# Patient Record
Sex: MALE | Race: WHITE | NOT HISPANIC OR LATINO | Employment: FULL TIME | URBAN - METROPOLITAN AREA
[De-identification: names, ages, dates, MRNs, and addresses within clinical notes are randomized per-mention and may not be internally consistent; named-entity substitution may affect disease eponyms.]

---

## 2022-07-07 ENCOUNTER — HOSPITAL ENCOUNTER (EMERGENCY)
Facility: HOSPITAL | Age: 52
Discharge: HOME | End: 2022-07-07
Attending: EMERGENCY MEDICINE | Admitting: EMERGENCY MEDICINE

## 2022-07-07 VITALS
SYSTOLIC BLOOD PRESSURE: 128 MMHG | OXYGEN SATURATION: 99 % | HEIGHT: 75 IN | HEART RATE: 65 BPM | BODY MASS INDEX: 23.99 KG/M2 | TEMPERATURE: 98 F | DIASTOLIC BLOOD PRESSURE: 79 MMHG | RESPIRATION RATE: 18 BRPM | WEIGHT: 192.9 LBS

## 2022-07-07 DIAGNOSIS — K04.7 DENTAL INFECTION: Primary | ICD-10-CM

## 2022-07-07 DIAGNOSIS — R22.0 RIGHT FACIAL SWELLING: ICD-10-CM

## 2022-07-07 PROCEDURE — 99282 EMERGENCY DEPT VISIT SF MDM: CPT

## 2022-07-07 PROCEDURE — 96372 THER/PROPH/DIAG INJ SC/IM: CPT

## 2022-07-07 PROCEDURE — 25010000002 CEFTRIAXONE PER 250 MG: Performed by: NURSE PRACTITIONER

## 2022-07-07 RX ORDER — IBUPROFEN 800 MG/1
800 TABLET ORAL EVERY 6 HOURS PRN
Qty: 30 TABLET | Refills: 0 | Status: SHIPPED | OUTPATIENT
Start: 2022-07-07

## 2022-07-07 RX ORDER — CLINDAMYCIN HYDROCHLORIDE 300 MG/1
300 CAPSULE ORAL 3 TIMES DAILY
Qty: 30 CAPSULE | Refills: 0 | Status: SHIPPED | OUTPATIENT
Start: 2022-07-07

## 2022-07-07 RX ADMIN — LIDOCAINE HYDROCHLORIDE 1 G: 10 INJECTION, SOLUTION EPIDURAL; INFILTRATION; INTRACAUDAL; PERINEURAL at 09:41

## 2022-07-07 NOTE — DISCHARGE INSTRUCTIONS
Return to ER if you worsen or develop a temperature of 100.4 or higher.  Take antibiotic as prescribed and pain medication as needed.    Follow-up with dentist.

## 2022-07-07 NOTE — ED PROVIDER NOTES
"Subjective   Pt to ed with c/o dental pain and swelling to the right side of face that started three days ago. pt states has chronic hx of \"bad teeth.\"  Rates pain a 3 out of 10 today.          Review of Systems   HENT: Positive for dental problem and facial swelling.    All other systems reviewed and are negative.      History reviewed. No pertinent past medical history.    No Known Allergies    History reviewed. No pertinent surgical history.    History reviewed. No pertinent family history.    Social History     Socioeconomic History   • Marital status:            Objective   Physical Exam  Vitals and nursing note reviewed.   Constitutional:       General: He is not in acute distress.     Appearance: Normal appearance. He is not ill-appearing, toxic-appearing or diaphoretic.   HENT:      Head: Normocephalic and atraumatic.      Jaw: Swelling present.        Mouth/Throat:      Lips: Pink. No lesions.      Mouth: Mucous membranes are moist.      Dentition: Abnormal dentition. Gingival swelling and dental caries present.      Pharynx: No oropharyngeal exudate or posterior oropharyngeal erythema.     Cardiovascular:      Rate and Rhythm: Normal rate.      Pulses: Normal pulses.   Pulmonary:      Effort: Pulmonary effort is normal.   Abdominal:      General: Abdomen is flat.      Palpations: Abdomen is soft.   Musculoskeletal:         General: Normal range of motion.      Cervical back: Normal range of motion and neck supple.   Skin:     General: Skin is warm and dry.      Capillary Refill: Capillary refill takes less than 2 seconds.   Neurological:      Mental Status: He is alert and oriented to person, place, and time.   Psychiatric:         Mood and Affect: Mood normal.         Behavior: Behavior normal.         Thought Content: Thought content normal.         Judgment: Judgment normal.         Procedures           ED Course                                           MDM  Number of Diagnoses or Management " Options  Dental infection  Right facial swelling  Diagnosis management comments: VS stable, NAD, afebrile.    Patient will follow-up with PCP in 2 to 3 days.  Educated him on worrisome symptoms to return for and he verbalized understanding.    Risk of Complications, Morbidity, and/or Mortality  Presenting problems: moderate  Diagnostic procedures: moderate  Management options: moderate    Patient Progress  Patient progress: stable      Final diagnoses:   Dental infection   Right facial swelling       ED Disposition  ED Disposition     ED Disposition   Discharge    Condition   Stable    Comment   --             [facility] EMERGENCY ROOM  [address]  [facility] [address] [phone]  Go to   If symptoms worsen         Medication List      New Prescriptions    clindamycin 300 MG capsule  Commonly known as: CLEOCIN  Take 1 capsule by mouth 3 (Three) Times a Day.     ibuprofen 800 MG tablet  Commonly known as: ADVIL,MOTRIN  Take 1 tablet by mouth Every 6 (Six) Hours As Needed for Mild Pain .           Where to Get Your Medications      These medications were sent to [id number] - [place], [place] - [address] AT [facility] - [phone]  - [phone] FX  [address] [address]    Phone: [phone]   · clindamycin 300 MG capsule  · ibuprofen 800 MG tablet          Ewa Thompson, SHEILA  07/07/22 7697

## 2022-07-11 ENCOUNTER — HOSPITAL ENCOUNTER (EMERGENCY)
Facility: HOSPITAL | Age: 52
Discharge: HOME | End: 2022-07-11
Attending: EMERGENCY MEDICINE | Admitting: EMERGENCY MEDICINE

## 2022-07-11 VITALS
DIASTOLIC BLOOD PRESSURE: 89 MMHG | RESPIRATION RATE: 16 BRPM | HEART RATE: 72 BPM | OXYGEN SATURATION: 97 % | WEIGHT: 195.99 LBS | BODY MASS INDEX: 24.37 KG/M2 | TEMPERATURE: 98 F | SYSTOLIC BLOOD PRESSURE: 126 MMHG | HEIGHT: 75 IN

## 2022-07-11 DIAGNOSIS — Z20.822 ENCOUNTER FOR SCREENING LABORATORY TESTING FOR COVID-19 VIRUS: Primary | ICD-10-CM

## 2022-07-11 LAB — SARS-COV-2 RNA PNL SPEC NAA+PROBE: NOT DETECTED

## 2022-07-11 PROCEDURE — C9803 HOPD COVID-19 SPEC COLLECT: HCPCS | Performed by: EMERGENCY MEDICINE

## 2022-07-11 PROCEDURE — U0004 COV-19 TEST NON-CDC HGH THRU: HCPCS | Performed by: EMERGENCY MEDICINE

## 2022-07-11 PROCEDURE — 99283 EMERGENCY DEPT VISIT LOW MDM: CPT

## 2022-07-11 NOTE — DISCHARGE INSTRUCTIONS
Please quarantine pending your COVID results which you [facility]

## 2022-07-11 NOTE — ED PROVIDER NOTES
Subjective   Patient is a [age]-year-old male presenting today due to needing a COVID test.  Patient states that his roommate tested positive for COVID today and he needs a test done for his work.  Patient denies cough, fever, chills, nausea or vomiting.      History provided by:  Patient   used: No        Review of Systems   Constitutional: Negative.    HENT: Negative.    Eyes: Negative.    Respiratory: Negative.    Cardiovascular: Negative.    Gastrointestinal: Negative.    Endocrine: Negative.    Genitourinary: Negative.    Musculoskeletal: Negative.    Skin: Negative.    Allergic/Immunologic: Negative.    Neurological: Negative.    Hematological: Negative.    Psychiatric/Behavioral: Negative.        Past Medical History:   Diagnosis Date   • Depression        No Known Allergies    History reviewed. No pertinent surgical history.    History reviewed. No pertinent family history.    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   Constitutional:       Appearance: Normal appearance.   HENT:      Head: Normocephalic and atraumatic.      Nose: Nose normal.      Mouth/Throat:      Mouth: Mucous membranes are moist.   Eyes:      Extraocular Movements: Extraocular movements intact.      Conjunctiva/sclera: Conjunctivae normal.      Pupils: Pupils are equal, round, and reactive to light.   Cardiovascular:      Rate and Rhythm: Normal rate and regular rhythm.      Heart sounds: Normal heart sounds.   Pulmonary:      Effort: Pulmonary effort is normal.       Breath sounds: Normal breath sounds.   Musculoskeletal:         General: Normal range of motion.      Cervical back: Normal range of motion and neck supple.   Skin:     General: Skin is warm and dry.   Neurological:      General: No focal deficit present.      Mental Status: He is alert and oriented to person, place, and time.   Psychiatric:         Mood and Affect: Mood normal.         Behavior: Behavior normal.         Procedures           ED Course                                           MDM  Number of Diagnoses or Management Options  Diagnosis management comments: I have spoken with patient. I have explained the patient´s condition, diagnoses and treatment plan based on the information available to me at this time. I have answered the patient's questions and addressed any concerns. The patient has a good  understanding of the patient´s diagnosis, condition, and treatment plan as can be expected at this point. The vital signs have been stable. The patient´s condition is stable and appropriate for discharge from the emergency department.      The patient will pursue further outpatient evaluation with the primary care physician or other designated or consulting physician as outlined in the discharge instructions. They are agreeable to this plan of care and follow-up instructions have been explained in detail. The patient has received these instructions in written format and have expressed an understanding of the discharge instructions. The patient is aware that any significant change in condition or worsening of symptoms should prompt an immediate return to this or the closest emergency department or call to 911.      Risk of Complications, Morbidity, and/or Mortality  Presenting problems: low  Diagnostic procedures: low  Management options: low    Patient Progress  Patient progress: stable      Final diagnoses:   Encounter for screening laboratory testing for COVID-19 virus       ED Disposition  ED Disposition      ED Disposition   Discharge    Condition   Stable    Comment   --             No follow-up provider specified.       Medication List      No changes were made to your prescriptions during this visit.          Madhuri Fontanez PA-C  07/11/22 3410

## 2023-05-08 ENCOUNTER — ANESTHESIA EVENT (OUTPATIENT)
Dept: PERIOP | Facility: HOSPITAL | Age: 53
End: 2023-05-08
Payer: COMMERCIAL

## 2023-05-08 ENCOUNTER — HOSPITAL ENCOUNTER (EMERGENCY)
Facility: HOSPITAL | Age: 53
Discharge: HOME | End: 2023-05-08
Attending: EMERGENCY MEDICINE | Admitting: SURGERY
Payer: COMMERCIAL

## 2023-05-08 ENCOUNTER — APPOINTMENT (OUTPATIENT)
Dept: CT IMAGING | Facility: HOSPITAL | Age: 53
End: 2023-05-08
Payer: COMMERCIAL

## 2023-05-08 ENCOUNTER — ANESTHESIA (OUTPATIENT)
Dept: PERIOP | Facility: HOSPITAL | Age: 53
End: 2023-05-08
Payer: COMMERCIAL

## 2023-05-08 VITALS
RESPIRATION RATE: 16 BRPM | TEMPERATURE: 97.4 F | HEART RATE: 64 BPM | SYSTOLIC BLOOD PRESSURE: 125 MMHG | BODY MASS INDEX: 25.71 KG/M2 | OXYGEN SATURATION: 98 % | DIASTOLIC BLOOD PRESSURE: 77 MMHG | WEIGHT: 206.79 LBS | HEIGHT: 75 IN

## 2023-05-08 DIAGNOSIS — K35.80 ACUTE APPENDICITIS, UNSPECIFIED ACUTE APPENDICITIS TYPE: Primary | ICD-10-CM

## 2023-05-08 PROBLEM — K35.30 ACUTE APPENDICITIS WITH LOCALIZED PERITONITIS: Status: ACTIVE | Noted: 2023-05-08

## 2023-05-08 LAB
ALBUMIN SERPL-MCNC: 4.2 G/DL (ref 3.5–5.2)
ALBUMIN/GLOB SERPL: 1.2 G/DL
ALP SERPL-CCNC: 96 U/L (ref 39–117)
ALT SERPL W P-5'-P-CCNC: 28 U/L (ref 1–41)
ANION GAP SERPL CALCULATED.3IONS-SCNC: 10.1 MMOL/L (ref 5–15)
AST SERPL-CCNC: 22 U/L (ref 1–40)
BACTERIA UR QL AUTO: ABNORMAL /HPF
BASOPHILS # BLD AUTO: 0.09 10*3/MM3 (ref 0–0.2)
BASOPHILS NFR BLD AUTO: 0.6 % (ref 0–1.5)
BILIRUB SERPL-MCNC: 0.3 MG/DL (ref 0–1.2)
BILIRUB UR QL STRIP: NEGATIVE
BUN SERPL-MCNC: 14 MG/DL (ref 6–20)
BUN/CREAT SERPL: 13.5 (ref 7–25)
CALCIUM SPEC-SCNC: 9.3 MG/DL (ref 8.6–10.5)
CHLORIDE SERPL-SCNC: 100 MMOL/L (ref 98–107)
CLARITY UR: CLEAR
CO2 SERPL-SCNC: 25.9 MMOL/L (ref 22–29)
COLOR UR: YELLOW
CREAT SERPL-MCNC: 1.04 MG/DL (ref 0.76–1.27)
D-LACTATE SERPL-SCNC: 1.3 MMOL/L (ref 0.5–2)
D-LACTATE SERPL-SCNC: 2.3 MMOL/L (ref 0.5–2)
DEPRECATED RDW RBC AUTO: 46.3 FL (ref 37–54)
EGFRCR SERPLBLD CKD-EPI 2021: 86.4 ML/MIN/1.73
EOSINOPHIL # BLD AUTO: 0.26 10*3/MM3 (ref 0–0.4)
EOSINOPHIL NFR BLD AUTO: 1.6 % (ref 0.3–6.2)
ERYTHROCYTE [DISTWIDTH] IN BLOOD BY AUTOMATED COUNT: 14.3 % (ref 12.3–15.4)
GLOBULIN UR ELPH-MCNC: 3.4 GM/DL
GLUCOSE SERPL-MCNC: 125 MG/DL (ref 65–99)
GLUCOSE UR STRIP-MCNC: NEGATIVE MG/DL
HCT VFR BLD AUTO: 47.1 % (ref 37.5–51)
HGB BLD-MCNC: 15.7 G/DL (ref 13–17.7)
HGB UR QL STRIP.AUTO: ABNORMAL
HOLD SPECIMEN: NORMAL
HOLD SPECIMEN: NORMAL
HYALINE CASTS UR QL AUTO: ABNORMAL /LPF
IMM GRANULOCYTES # BLD AUTO: 0.08 10*3/MM3 (ref 0–0.05)
IMM GRANULOCYTES NFR BLD AUTO: 0.5 % (ref 0–0.5)
KETONES UR QL STRIP: NEGATIVE
LEUKOCYTE ESTERASE UR QL STRIP.AUTO: NEGATIVE
LIPASE SERPL-CCNC: 43 U/L (ref 13–60)
LYMPHOCYTES # BLD AUTO: 2.36 10*3/MM3 (ref 0.7–3.1)
LYMPHOCYTES NFR BLD AUTO: 14.5 % (ref 19.6–45.3)
MCH RBC QN AUTO: 29.5 PG (ref 26.6–33)
MCHC RBC AUTO-ENTMCNC: 33.3 G/DL (ref 31.5–35.7)
MCV RBC AUTO: 88.4 FL (ref 79–97)
MONOCYTES # BLD AUTO: 1.01 10*3/MM3 (ref 0.1–0.9)
MONOCYTES NFR BLD AUTO: 6.2 % (ref 5–12)
NEUTROPHILS NFR BLD AUTO: 12.43 10*3/MM3 (ref 1.7–7)
NEUTROPHILS NFR BLD AUTO: 76.6 % (ref 42.7–76)
NITRITE UR QL STRIP: NEGATIVE
NRBC BLD AUTO-RTO: 0 /100 WBC (ref 0–0.2)
PH UR STRIP.AUTO: <=5 [PH] (ref 5–8)
PLATELET # BLD AUTO: 262 10*3/MM3 (ref 140–450)
PMV BLD AUTO: 9.7 FL (ref 6–12)
POTASSIUM SERPL-SCNC: 4.4 MMOL/L (ref 3.5–5.2)
PROT SERPL-MCNC: 7.6 G/DL (ref 6–8.5)
PROT UR QL STRIP: NEGATIVE
RBC # BLD AUTO: 5.33 10*6/MM3 (ref 4.14–5.8)
RBC # UR STRIP: ABNORMAL /HPF
REF LAB TEST METHOD: ABNORMAL
SODIUM SERPL-SCNC: 136 MMOL/L (ref 136–145)
SP GR UR STRIP: 1.03 (ref 1–1.03)
SQUAMOUS #/AREA URNS HPF: ABNORMAL /HPF
UROBILINOGEN UR QL STRIP: ABNORMAL
WBC # UR STRIP: ABNORMAL /HPF
WBC NRBC COR # BLD: 16.23 10*3/MM3 (ref 3.4–10.8)
WHOLE BLOOD HOLD COAG: NORMAL
WHOLE BLOOD HOLD SPECIMEN: NORMAL

## 2023-05-08 PROCEDURE — 25510000001 IOPAMIDOL PER 1 ML: Performed by: EMERGENCY MEDICINE

## 2023-05-08 PROCEDURE — 81001 URINALYSIS AUTO W/SCOPE: CPT | Performed by: EMERGENCY MEDICINE

## 2023-05-08 PROCEDURE — 25010000002 PROPOFOL 10 MG/ML EMULSION: Performed by: NURSE ANESTHETIST, CERTIFIED REGISTERED

## 2023-05-08 PROCEDURE — 85025 COMPLETE CBC W/AUTO DIFF WBC: CPT | Performed by: EMERGENCY MEDICINE

## 2023-05-08 PROCEDURE — 80053 COMPREHEN METABOLIC PANEL: CPT | Performed by: EMERGENCY MEDICINE

## 2023-05-08 PROCEDURE — 44970 LAPAROSCOPY APPENDECTOMY: CPT | Performed by: SURGERY

## 2023-05-08 PROCEDURE — 99284 EMERGENCY DEPT VISIT MOD MDM: CPT

## 2023-05-08 PROCEDURE — 96365 THER/PROPH/DIAG IV INF INIT: CPT

## 2023-05-08 PROCEDURE — 96375 TX/PRO/DX INJ NEW DRUG ADDON: CPT

## 2023-05-08 PROCEDURE — 36415 COLL VENOUS BLD VENIPUNCTURE: CPT

## 2023-05-08 PROCEDURE — 74177 CT ABD & PELVIS W/CONTRAST: CPT

## 2023-05-08 PROCEDURE — 25010000002 ONDANSETRON PER 1 MG: Performed by: EMERGENCY MEDICINE

## 2023-05-08 PROCEDURE — 25010000002 PIPERACILLIN SOD-TAZOBACTAM PER 1 G: Performed by: EMERGENCY MEDICINE

## 2023-05-08 PROCEDURE — 25010000002 DEXAMETHASONE SODIUM PHOSPHATE 100 MG/10ML SOLUTION: Performed by: SURGERY

## 2023-05-08 PROCEDURE — 88304 TISSUE EXAM BY PATHOLOGIST: CPT | Performed by: SURGERY

## 2023-05-08 PROCEDURE — 99223 1ST HOSP IP/OBS HIGH 75: CPT | Performed by: SURGERY

## 2023-05-08 PROCEDURE — 99285 EMERGENCY DEPT VISIT HI MDM: CPT

## 2023-05-08 PROCEDURE — 25010000002 BUPRENORPHINE PER 0.1 MG: Performed by: SURGERY

## 2023-05-08 PROCEDURE — 83605 ASSAY OF LACTIC ACID: CPT | Performed by: EMERGENCY MEDICINE

## 2023-05-08 PROCEDURE — 25010000002 SUGAMMADEX 200 MG/2ML SOLUTION: Performed by: NURSE ANESTHETIST, CERTIFIED REGISTERED

## 2023-05-08 PROCEDURE — 83690 ASSAY OF LIPASE: CPT | Performed by: EMERGENCY MEDICINE

## 2023-05-08 PROCEDURE — 25010000002 MIDAZOLAM PER 1MG: Performed by: ANESTHESIOLOGY

## 2023-05-08 PROCEDURE — 44970 LAPAROSCOPY APPENDECTOMY: CPT

## 2023-05-08 PROCEDURE — 25010000002 HYDROMORPHONE 1 MG/ML SOLUTION: Performed by: EMERGENCY MEDICINE

## 2023-05-08 DEVICE — ENDOPATH ECHELON ENDOSCOPIC LINEAR CUTTER RELOADS, BLUE, 60MM
Type: IMPLANTABLE DEVICE | Site: ABDOMEN | Status: FUNCTIONAL
Brand: ECHELON ENDOPATH

## 2023-05-08 RX ORDER — HYDROCODONE BITARTRATE AND ACETAMINOPHEN 5; 325 MG/1; MG/1
1 TABLET ORAL EVERY 6 HOURS PRN
Qty: 8 TABLET | Refills: 0 | Status: SHIPPED | OUTPATIENT
Start: 2023-05-08

## 2023-05-08 RX ORDER — PROMETHAZINE HYDROCHLORIDE 12.5 MG/1
25 TABLET ORAL ONCE AS NEEDED
Status: DISCONTINUED | OUTPATIENT
Start: 2023-05-08 | End: 2023-05-08 | Stop reason: HOSPADM

## 2023-05-08 RX ORDER — MAGNESIUM HYDROXIDE 1200 MG/15ML
LIQUID ORAL AS NEEDED
Status: DISCONTINUED | OUTPATIENT
Start: 2023-05-08 | End: 2023-05-08 | Stop reason: HOSPADM

## 2023-05-08 RX ORDER — PROCHLORPERAZINE EDISYLATE 5 MG/ML
5 INJECTION INTRAMUSCULAR; INTRAVENOUS EVERY 6 HOURS PRN
Status: DISCONTINUED | OUTPATIENT
Start: 2023-05-08 | End: 2023-05-08

## 2023-05-08 RX ORDER — PROMETHAZINE HYDROCHLORIDE 25 MG/1
25 SUPPOSITORY RECTAL ONCE AS NEEDED
Status: DISCONTINUED | OUTPATIENT
Start: 2023-05-08 | End: 2023-05-08 | Stop reason: HOSPADM

## 2023-05-08 RX ORDER — SODIUM CHLORIDE, SODIUM LACTATE, POTASSIUM CHLORIDE, CALCIUM CHLORIDE 600; 310; 30; 20 MG/100ML; MG/100ML; MG/100ML; MG/100ML
9 INJECTION, SOLUTION INTRAVENOUS CONTINUOUS PRN
Status: DISCONTINUED | OUTPATIENT
Start: 2023-05-08 | End: 2023-05-08 | Stop reason: HOSPADM

## 2023-05-08 RX ORDER — SUCCINYLCHOLINE/SOD CL,ISO/PF 100 MG/5ML
SYRINGE (ML) INTRAVENOUS AS NEEDED
Status: DISCONTINUED | OUTPATIENT
Start: 2023-05-08 | End: 2023-05-08 | Stop reason: SURG

## 2023-05-08 RX ORDER — ROCURONIUM BROMIDE 10 MG/ML
INJECTION, SOLUTION INTRAVENOUS AS NEEDED
Status: DISCONTINUED | OUTPATIENT
Start: 2023-05-08 | End: 2023-05-08 | Stop reason: SURG

## 2023-05-08 RX ORDER — ONDANSETRON 2 MG/ML
4 INJECTION INTRAMUSCULAR; INTRAVENOUS EVERY 6 HOURS PRN
Status: DISCONTINUED | OUTPATIENT
Start: 2023-05-08 | End: 2023-05-08 | Stop reason: HOSPADM

## 2023-05-08 RX ORDER — PROPOFOL 10 MG/ML
VIAL (ML) INTRAVENOUS AS NEEDED
Status: DISCONTINUED | OUTPATIENT
Start: 2023-05-08 | End: 2023-05-08 | Stop reason: SURG

## 2023-05-08 RX ORDER — ONDANSETRON 2 MG/ML
4 INJECTION INTRAMUSCULAR; INTRAVENOUS EVERY 6 HOURS PRN
Status: DISCONTINUED | OUTPATIENT
Start: 2023-05-08 | End: 2023-05-08

## 2023-05-08 RX ORDER — KETAMINE HCL IN NACL, ISO-OSM 100MG/10ML
SYRINGE (ML) INJECTION AS NEEDED
Status: DISCONTINUED | OUTPATIENT
Start: 2023-05-08 | End: 2023-05-08 | Stop reason: SURG

## 2023-05-08 RX ORDER — SODIUM CHLORIDE, SODIUM LACTATE, POTASSIUM CHLORIDE, CALCIUM CHLORIDE 600; 310; 30; 20 MG/100ML; MG/100ML; MG/100ML; MG/100ML
100 INJECTION, SOLUTION INTRAVENOUS CONTINUOUS
Status: CANCELLED | OUTPATIENT
Start: 2023-05-08

## 2023-05-08 RX ORDER — OXYCODONE HYDROCHLORIDE 5 MG/1
5 TABLET ORAL
Status: DISCONTINUED | OUTPATIENT
Start: 2023-05-08 | End: 2023-05-08 | Stop reason: HOSPADM

## 2023-05-08 RX ORDER — DEXMEDETOMIDINE HYDROCHLORIDE 100 UG/ML
INJECTION, SOLUTION INTRAVENOUS AS NEEDED
Status: DISCONTINUED | OUTPATIENT
Start: 2023-05-08 | End: 2023-05-08 | Stop reason: SURG

## 2023-05-08 RX ORDER — ONDANSETRON 2 MG/ML
4 INJECTION INTRAMUSCULAR; INTRAVENOUS ONCE
Status: COMPLETED | OUTPATIENT
Start: 2023-05-08 | End: 2023-05-08

## 2023-05-08 RX ORDER — ACETAMINOPHEN 500 MG
1000 TABLET ORAL ONCE
Status: COMPLETED | OUTPATIENT
Start: 2023-05-08 | End: 2023-05-08

## 2023-05-08 RX ORDER — PROCHLORPERAZINE EDISYLATE 5 MG/ML
5 INJECTION INTRAMUSCULAR; INTRAVENOUS EVERY 6 HOURS PRN
Status: DISCONTINUED | OUTPATIENT
Start: 2023-05-08 | End: 2023-05-08 | Stop reason: HOSPADM

## 2023-05-08 RX ORDER — MIDAZOLAM HYDROCHLORIDE 2 MG/2ML
2 INJECTION, SOLUTION INTRAMUSCULAR; INTRAVENOUS ONCE
Status: COMPLETED | OUTPATIENT
Start: 2023-05-08 | End: 2023-05-08

## 2023-05-08 RX ORDER — SODIUM CHLORIDE 0.9 % (FLUSH) 0.9 %
10 SYRINGE (ML) INJECTION AS NEEDED
Status: DISCONTINUED | OUTPATIENT
Start: 2023-05-08 | End: 2023-05-08 | Stop reason: HOSPADM

## 2023-05-08 RX ORDER — POLYETHYLENE GLYCOL 3350 17 G/17G
17 POWDER, FOR SOLUTION ORAL DAILY
Qty: 5 PACKET | Refills: 0 | Status: SHIPPED | OUTPATIENT
Start: 2023-05-08

## 2023-05-08 RX ORDER — ONDANSETRON 2 MG/ML
4 INJECTION INTRAMUSCULAR; INTRAVENOUS ONCE AS NEEDED
Status: DISCONTINUED | OUTPATIENT
Start: 2023-05-08 | End: 2023-05-08 | Stop reason: HOSPADM

## 2023-05-08 RX ORDER — LIDOCAINE HYDROCHLORIDE 20 MG/ML
INJECTION, SOLUTION EPIDURAL; INFILTRATION; INTRACAUDAL; PERINEURAL AS NEEDED
Status: DISCONTINUED | OUTPATIENT
Start: 2023-05-08 | End: 2023-05-08 | Stop reason: SURG

## 2023-05-08 RX ORDER — MEPERIDINE HYDROCHLORIDE 25 MG/ML
12.5 INJECTION INTRAMUSCULAR; INTRAVENOUS; SUBCUTANEOUS
Status: DISCONTINUED | OUTPATIENT
Start: 2023-05-08 | End: 2023-05-08 | Stop reason: HOSPADM

## 2023-05-08 RX ADMIN — ONDANSETRON 4 MG: 2 INJECTION INTRAMUSCULAR; INTRAVENOUS at 07:26

## 2023-05-08 RX ADMIN — PROPOFOL 180 MG: 10 INJECTION, EMULSION INTRAVENOUS at 13:49

## 2023-05-08 RX ADMIN — SUGAMMADEX 200 MG: 100 INJECTION, SOLUTION INTRAVENOUS at 14:12

## 2023-05-08 RX ADMIN — Medication 60 MG: at 13:49

## 2023-05-08 RX ADMIN — MIDAZOLAM HYDROCHLORIDE 2 MG: 1 INJECTION, SOLUTION INTRAMUSCULAR; INTRAVENOUS at 13:23

## 2023-05-08 RX ADMIN — PIPERACILLIN SODIUM AND TAZOBACTAM SODIUM 3.38 G: 3; .375 INJECTION, POWDER, LYOPHILIZED, FOR SOLUTION INTRAVENOUS at 09:56

## 2023-05-08 RX ADMIN — SODIUM CHLORIDE, POTASSIUM CHLORIDE, SODIUM LACTATE AND CALCIUM CHLORIDE 9 ML/HR: 600; 310; 30; 20 INJECTION, SOLUTION INTRAVENOUS at 13:20

## 2023-05-08 RX ADMIN — IOPAMIDOL 100 ML: 755 INJECTION, SOLUTION INTRAVENOUS at 08:08

## 2023-05-08 RX ADMIN — SODIUM CHLORIDE 1000 ML: 9 INJECTION, SOLUTION INTRAVENOUS at 07:26

## 2023-05-08 RX ADMIN — Medication 10 MG: at 13:49

## 2023-05-08 RX ADMIN — HYDROMORPHONE HYDROCHLORIDE 1 MG: 1 INJECTION, SOLUTION INTRAMUSCULAR; INTRAVENOUS; SUBCUTANEOUS at 07:26

## 2023-05-08 RX ADMIN — ROCURONIUM BROMIDE 25 MG: 10 INJECTION, SOLUTION INTRAVENOUS at 13:58

## 2023-05-08 RX ADMIN — DEXMEDETOMIDINE HYDROCHLORIDE 20 MCG: 100 INJECTION, SOLUTION, CONCENTRATE INTRAVENOUS at 13:49

## 2023-05-08 RX ADMIN — ACETAMINOPHEN 1000 MG: 500 TABLET ORAL at 13:23

## 2023-05-08 RX ADMIN — Medication 20 MG: at 13:55

## 2023-05-08 RX ADMIN — LIDOCAINE HYDROCHLORIDE 100 MG: 20 INJECTION, SOLUTION EPIDURAL; INFILTRATION; INTRACAUDAL; PERINEURAL at 13:49

## 2023-05-08 NOTE — ED PROVIDER NOTES
Time: 7:00 AM EDT  Date of encounter:  5/8/2023  Independent Historian/Clinical History and Information was obtained by:   Patient  Chief Complaint: abdominal pain    History is limited by: N/A    History of Present Illness:  Patient is a 52 y.o. year old male who presents to the emergency department for evaluation of abdominal pain that is right-sided and started this morning at 3:00.  Patient reports that it was accompanied by vomiting and diarrhea.  Patient denies fever and chills.  Patient has no dysuria urinary frequency.  Denies cough and hemoptysis.  Patient denies sick contacts.    HPI    Patient Care Team  Primary Care Provider: Provider, Chel Known    Past Medical History:     No Known Allergies  Past Medical History:   Diagnosis Date   • Depression      History reviewed. No pertinent surgical history.  History reviewed. No pertinent family history.    Home Medications:  Prior to Admission medications    Medication Sig Start Date End Date Taking? Authorizing Provider   clindamycin (CLEOCIN) 300 MG capsule Take 1 capsule by mouth 3 (Three) Times a Day. 7/7/22   Ewa Thompson APRN   FLUoxetine HCl (PROZAC PO) Take  by mouth.    Provider, MD Berna   ibuprofen (ADVIL,MOTRIN) 800 MG tablet Take 1 tablet by mouth Every 6 (Six) Hours As Needed for Mild Pain . 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   HENT: Negative for congestion, rhinorrhea and sore throat.    Eyes: Negative for pain and visual disturbance.   Respiratory: Negative for apnea, cough, chest tightness and  "shortness of breath.    Cardiovascular: Negative for chest pain and palpitations.   Gastrointestinal: Positive for abdominal pain, diarrhea, nausea and vomiting.   Genitourinary: Negative for difficulty urinating and dysuria.   Musculoskeletal: Negative for joint swelling and myalgias.   Skin: Negative for color change.   Neurological: Negative for seizures and headaches.   Psychiatric/Behavioral: Negative.    All other systems reviewed and are negative.       Physical Exam:  /90   Pulse 72   Temp 97.5 °F (36.4 °C) (Oral)   Resp 20   Ht 190.5 cm (75\")   Wt 93.8 kg (206 lb 12.7 oz)   SpO2 94%   BMI 25.85 kg/m²     Physical Exam  Vitals and nursing note reviewed.   Constitutional:       General: He is not in acute distress.     Appearance: Normal appearance. He is not toxic-appearing.   HENT:      Head: Normocephalic and atraumatic.      Jaw: There is normal jaw occlusion.   Eyes:      General: Lids are normal.      Extraocular Movements: Extraocular movements intact.      Conjunctiva/sclera: Conjunctivae normal.      Pupils: Pupils are equal, round, and reactive to light.   Cardiovascular:      Rate and Rhythm: Normal rate and regular rhythm.      Pulses: Normal pulses.      Heart sounds: Normal heart sounds.   Pulmonary:      Effort: Pulmonary effort is normal. No respiratory distress.      Breath sounds: Normal breath sounds. No wheezing or rhonchi.   Abdominal:      General: Abdomen is flat.      Palpations: Abdomen is soft.      Tenderness: There is abdominal tenderness in the right lower quadrant. There is no guarding or rebound.   Musculoskeletal:         General: Normal range of motion.      Cervical back: Normal range of motion and neck supple.      Right lower leg: No edema.      Left lower leg: No edema.   Skin:     General: Skin is warm and dry.   Neurological:      Mental Status: He is alert and oriented to person, place, and time. Mental status is at baseline.   Psychiatric:         Mood and " Affect: Mood normal.                  Procedures:  Procedures      Medical Decision Making:      Comorbidities that affect care:    Depression    External Notes reviewed:    Previous ED Note: Patient was last seen for COVID exposure.      The following orders were placed and all results were independently analyzed by me:  Orders Placed This Encounter   Procedures   • CT Abdomen Pelvis With Contrast   • [place] Draw   • Comprehensive Metabolic Panel   • Lipase   • Urinalysis With Microscopic If Indicated (No Culture) - Urine, Clean Catch   • Lactic Acid, Plasma   • CBC Auto Differential   • STAT Lactic Acid, Reflex   • Urinalysis, Microscopic Only - Urine, Clean Catch   • NPO Diet NPO Type: Strict NPO   • Undress & Gown   • Surgery (on-call MD unless specified)   • Insert Peripheral IV   • CBC & Differential   • Green Top (Gel)   • Lavender Top   • Gold Top - SST   • Light Blue Top       Medications Given in the Emergency Department:  Medications   sodium chloride 0.9 % flush 10 mL (has no administration in time range)   piperacillin-tazobactam (ZOSYN) 3.375 g/100 mL 0.9% NS IVPB (mbp) (has no administration in time range)   sodium chloride 0.9 % bolus 1,000 mL (1,000 mL Intravenous New Bag 5/8/23 0726)   ondansetron (ZOFRAN) injection 4 mg (4 mg Intravenous Given 5/8/23 0726)   HYDROmorphone (DILAUDID) injection 1 mg (1 mg Intravenous Given 5/8/23 0726)   iopamidol (ISOVUE-370) 76 % injection 100 mL (100 mL Intravenous Given 5/8/23 0808)        ED Course:         Labs:    Lab Results (last 24 hours)     Procedure Component Value Units Date/Time    CBC & Differential [[id number]]  (Abnormal) Collected: 05/08/23 0652    Specimen: Blood Updated: 05/08/23 0658    Narrative:      The following orders were created for panel order CBC & Differential.  Procedure                               Abnormality         Status                     ---------                               -----------         ------                      CBC Auto Differential[[id number]]        Abnormal            Final result                 Please view results for these tests on the individual orders.    Comprehensive Metabolic Panel [[id number]]  (Abnormal) Collected: 05/08/23 0652    Specimen: Blood Updated: 05/08/23 0718     Glucose 125 mg/dL      BUN 14 mg/dL      Creatinine 1.04 mg/dL      Sodium 136 mmol/L      Potassium 4.4 mmol/L      Chloride 100 mmol/L      CO2 25.9 mmol/L      Calcium 9.3 mg/dL      Total Protein 7.6 g/dL      Albumin 4.2 g/dL      ALT (SGPT) 28 U/L      AST (SGOT) 22 U/L      Alkaline Phosphatase 96 U/L      Total Bilirubin 0.3 mg/dL      Globulin 3.4 gm/dL      A/G Ratio 1.2 g/dL      BUN/Creatinine Ratio 13.5     Anion Gap 10.1 mmol/L      eGFR 86.4 mL/min/1.73     Narrative:      GFR Normal >60  Chronic Kidney Disease <60  Kidney Failure <15      Lipase [[id number]]  (Normal) Collected: 05/08/23 0652    Specimen: Blood Updated: 05/08/23 0718     Lipase 43 U/L     Lactic Acid, Plasma [[id number]]  (Abnormal) Collected: 05/08/23 0652    Specimen: Blood Updated: 05/08/23 0752     Lactate 2.3 mmol/L     CBC Auto Differential [[id number]]  (Abnormal) Collected: 05/08/23 0652    Specimen: Blood Updated: 05/08/23 0658     WBC 16.23 10*3/mm3      RBC 5.33 10*6/mm3      Hemoglobin 15.7 g/dL      Hematocrit 47.1 %      MCV 88.4 fL      MCH 29.5 pg      MCHC 33.3 g/dL      RDW 14.3 %      RDW-SD 46.3 fl      MPV 9.7 fL      Platelets 262 10*3/mm3      Neutrophil % 76.6 %      Lymphocyte % 14.5 %      Monocyte % 6.2 %      Eosinophil % 1.6 %      Basophil % 0.6 %      Immature Grans % 0.5 %      Neutrophils, Absolute 12.43 10*3/mm3      Lymphocytes, Absolute 2.36 10*3/mm3      Monocytes, Absolute 1.01 10*3/mm3      Eosinophils, Absolute 0.26 10*3/mm3      Basophils, Absolute 0.09 10*3/mm3      Immature Grans, Absolute 0.08 10*3/mm3      nRBC 0.0 /100 WBC     Urinalysis With Microscopic If Indicated (No Culture) - Urine, Clean Catch [[id number]]   (Abnormal) Collected: 05/08/23 0726    Specimen: Urine, Clean Catch Updated: 05/08/23 0757     Color, UA Yellow     Appearance, UA Clear     pH, UA <=5.0     Specific Gravity, UA 1.026     Glucose, UA Negative     Ketones, UA Negative     Bilirubin, UA Negative     Blood, UA Trace     Protein, UA Negative     Leuk Esterase, UA Negative     Nitrite, UA Negative     Urobilinogen, UA 1.0 E.U./dL    Urinalysis, Microscopic Only - Urine, Clean Catch [[id number]]  (Abnormal) Collected: 05/08/23 0726    Specimen: Urine, Clean Catch Updated: 05/08/23 0757     RBC, UA 3-5 /HPF      WBC, UA 0-2 /HPF      Bacteria, UA None Seen /HPF      Squamous Epithelial Cells, UA 0-2 /HPF      Hyaline Casts, UA 0-2 /LPF      Methodology Automated Microscopy           Imaging:    CT Abdomen Pelvis With Contrast    Result Date: 5/8/2023  PROCEDURE: CT ABDOMEN PELVIS W CONTRAST  COMPARISON: None  INDICATIONS: GENERALIZED ABDOMEN PAIN WITH NAUSEA AND VOMITTING  TECHNIQUE: After obtaining the patient's consent, CT images were created with non-ionic intravenous contrast material.   PROTOCOL:   Standard imaging protocol performed    RADIATION:   DLP: 867.6mGy*cm   Automated exposure control was utilized to minimize radiation dose. CONTRAST: 100cc Isovue 370 I.V.  FINDINGS:  Within the lung bases is dependent atelectasis.  The liver, gallbladder, adrenal glands, kidneys, spleen, and pancreas are unremarkable.  The stomach appears normal.  The small bowel appears normal in caliber and configuration.  The colon appears normal.  The appendix is enlarged with mild surrounding inflammatory changes suggesting developing appendicitis.  There is no ascites or loculated collection.  No abnormally enlarged lymph nodes are identified.  The rectum, prostate, and urinary bladder are unremarkable.  No aggressive osseous lesions are identified.       Appendix is enlarged with mild surrounding inflammatory changes, suggesting developing appendicitis.     TYREE  MD ZEINAB       Electronically Signed and Approved By: TYREE ARRIOLA MD on 5/08/2023 at 8:18                 Differential Diagnosis and Discussion:    Abdominal Pain: Based on the patient's signs and symptoms, I considered abdominal aortic aneurysm, small bowel obstruction, pancreatitis, acute cholecystitis, acute appendecitis, peptic ulcer disease, gastritis, colitis, endocrine disorders, irritable bowel syndrome and other differential diagnosis an etiology of the patient's abdominal pain.    All labs were reviewed and interpreted by me.  CT scan radiology impression was interpreted by me.    MDM     The patient´s CBC that was reviewed and interpreted by me shows no abnormalities of critical concern. Of note, there is no anemia requiring a blood transfusion and the platelet count is acceptable.  The patient´s CMP that was reviewed and interpretted by me shows no abnormalities of critical concern. Of note, the patient´s sodium and potassium are acceptable. The patient´s liver enzymes are unremarkable. The patient´s renal function (creatinine) is preserved. The patient has a normal anion gap.  Urinalysis is negative for hematuria, ketonuria and bacteriuria.CT scan of the abdomen pelvis is consistent with acute appendicitis.    Decision making regarding admission:      A 52-year-old male presenting with acute appendicitis should be admitted to the hospital due to the potential risks and complications associated with this condition. Acute appendicitis, characterized by the inflammation of the appendix, is a medical emergency that requires prompt attention and treatment to prevent life-threatening complications. Hospital admission allows for timely surgical intervention, typically through an appendectomy, to remove the inflamed appendix and avoid the risk of rupture. A ruptured appendix can lead to the spread of infection within the abdominal cavity, causing peritonitis or an abscess, which can be  life-threatening. Additionally, hospitalization ensures proper monitoring of the patient's condition, pain management, and postoperative care, including antibiotics and support for the patient's recovery.        Patient Care Considerations:    None      Consultants/Shared Management Plan:    Case was discussed with Dr. Ashby who request giving the patient a dose of Zosyn and will take the patient to the OR.    Social Determinants of Health:    Patient is independent, reliable, and has access to care.       Disposition and Care Coordination:    Admit:   Through independent evaluation of the patient's history, physical, and imperical data, the patient meets criteria for observation/admission to the hospital.        Final diagnoses:   Acute appendicitis, unspecified acute appendicitis type        ED Disposition     ED Disposition   Decision to Admit    Condition   --    Comment   --             This medical record created using voice recognition software.           Carmela Stuart MD  05/08/23 0941       Carmela Stuart MD  05/08/23 0942

## 2023-05-08 NOTE — H&P
History and Physical    Patient Name:  Clay Carreon  YOB: 1970  [id number]    Referring Provider: . Dr. Stuart    Patient Care Team:  Provider, No Known as PCP - General    Reason for consult/Chief complaint:  RLQ abd pain     Subjective .     History of present illness:  Mr. Carreon is a 52 year old gentleman who presents to the ED at [facility] with sharp RLQ abdominal pain that started abruptly around 3 AM accompanied by nausea and vomiting.  His pain is made worse with activity.  He denies fever, chills, diarrhea or constipation.  He had a CT scan that indicates he has an acute appendicitis.  His WBC is 16.  VSS.       History:  Past Medical History:   Diagnosis Date   • Depression        History reviewed. No pertinent surgical history.    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    MEDS:  Prior to Admission medications    Medication Sig Start Date End Date Taking? Authorizing Provider   clindamycin (CLEOCIN) 300 MG capsule Take 1 capsule by mouth 3 (Three) Times a Day. 7/7/22   Ewa Thompson APRN   FLUoxetine HCl (PROZAC PO) Take  by mouth.    Provider, MD Berna   ibuprofen (ADVIL,MOTRIN) 800 MG tablet Take 1 tablet by mouth Every 6 (Six) Hours As Needed for Mild Pain . 7/7/22   Ewa Thompson APRN        Nerve Block, 30.7 mL, Injection, Once  piperacillin-tazobactam, 3.375 g, Intravenous, Once               Allergies:  Patient has no known allergies.    Objective         Physical  "Exam:      Constitutuional:  well nourished, no acute distress, appear stated age /90   Pulse 60   Temp 97.5 °F (36.4 °C) (Oral)   Resp 20   Ht 190.5 cm (75\")   Wt 93.8 kg (206 lb 12.7 oz)   SpO2 97%   BMI 25.85 kg/m²    Eyes:  anicteric sclerae, moist conjunctivae, no lid lag, PERRLA  ENMT:  oropharynx clear, moist mucous membranes, good dentition  Neck:   full ROM, trachea midline, no thyromegaly  Cardiovascular: RRR, S1 and S2 present, no MRG's, heart rate 60, no pedal edema  Respiratory: lungs CTA, respirations even and unlabored  GI:  Abdomen soft, RLQ tender, nondistended, no HSM     Skin:  warm and dry, normal turgor, no rashes  Psychiatric:  alert and oriented x3, intact judgement and insight, cooperative         Results Review: I have reviewed the patient's labs and imaging including CBC, BMP, CT of abd and pelvis    LABS/IMAGING:    WBC   Date Value Ref Range Status   05/08/2023 16.23 (H) 3.40 - 10.80 10*3/mm3 Final     RBC   Date Value Ref Range Status   05/08/2023 5.33 4.14 - 5.80 10*6/mm3 Final     Hemoglobin   Date Value Ref Range Status   05/08/2023 15.7 13.0 - 17.7 g/dL Final     Hematocrit   Date Value Ref Range Status   05/08/2023 47.1 37.5 - 51.0 % Final     MCV   Date Value Ref Range Status   05/08/2023 88.4 79.0 - 97.0 fL Final     MCH   Date Value Ref Range Status   05/08/2023 29.5 26.6 - 33.0 pg Final     MCHC   Date Value Ref Range Status   05/08/2023 33.3 31.5 - 35.7 g/dL Final     RDW   Date Value Ref Range Status   05/08/2023 14.3 12.3 - 15.4 % Final     RDW-SD   Date Value Ref Range Status   05/08/2023 46.3 37.0 - 54.0 fl Final     MPV   Date Value Ref Range Status   05/08/2023 9.7 6.0 - 12.0 fL Final     Platelets   Date Value Ref Range Status   05/08/2023 262 140 - 450 10*3/mm3 Final     Neutrophil %   Date Value Ref Range Status   05/08/2023 76.6 (H) 42.7 - 76.0 % Final     Lymphocyte %   Date Value Ref Range Status   05/08/2023 14.5 (L) 19.6 - 45.3 % Final     Monocyte % "   Date Value Ref Range Status   05/08/2023 6.2 5.0 - 12.0 % Final     Eosinophil %   Date Value Ref Range Status   05/08/2023 1.6 0.3 - 6.2 % Final     Basophil %   Date Value Ref Range Status   05/08/2023 0.6 0.0 - 1.5 % Final     Immature Grans %   Date Value Ref Range Status   05/08/2023 0.5 0.0 - 0.5 % Final     Neutrophils, Absolute   Date Value Ref Range Status   05/08/2023 12.43 (H) 1.70 - 7.00 10*3/mm3 Final     Lymphocytes, Absolute   Date Value Ref Range Status   05/08/2023 2.36 0.70 - 3.10 10*3/mm3 Final     Monocytes, Absolute   Date Value Ref Range Status   05/08/2023 1.01 (H) 0.10 - 0.90 10*3/mm3 Final     Eosinophils, Absolute   Date Value Ref Range Status   05/08/2023 0.26 0.00 - 0.40 10*3/mm3 Final     Basophils, Absolute   Date Value Ref Range Status   05/08/2023 0.09 0.00 - 0.20 10*3/mm3 Final     Immature Grans, Absolute   Date Value Ref Range Status   05/08/2023 0.08 (H) 0.00 - 0.05 10*3/mm3 Final     nRBC   Date Value Ref Range Status   05/08/2023 0.0 0.0 - 0.2 /100 WBC Final        Basic Metabolic Panel    Sodium Sodium   Date Value Ref Range Status   05/08/2023 136 136 - 145 mmol/L Final      Potassium Potassium   Date Value Ref Range Status   05/08/2023 4.4 3.5 - 5.2 mmol/L Final      Chloride Chloride   Date Value Ref Range Status   05/08/2023 100 98 - 107 mmol/L Final      Bicarbonate No results found for: PLASMABICARB   BUN BUN   Date Value Ref Range Status   05/08/2023 14 6 - 20 mg/dL Final      Creatinine Creatinine   Date Value Ref Range Status   05/08/2023 1.04 0.76 - 1.27 mg/dL Final      Calcium Calcium   Date Value Ref Range Status   05/08/2023 9.3 8.6 - 10.5 mg/dL Final      Glucose      No components found for: GLUCOSE.*        Lab Results   Component Value Date    GLUCOSE 125 (H) 05/08/2023    BUN 14 05/08/2023    CREATININE 1.04 05/08/2023    BCR 13.5 05/08/2023    K 4.4 05/08/2023    CO2 25.9 05/08/2023    CALCIUM 9.3 05/08/2023    ALBUMIN 4.2 05/08/2023    AST 22 05/08/2023     ALT 28 05/08/2023          CT Abdomen Pelvis With Contrast    Result Date: 5/8/2023  PROCEDURE: CT ABDOMEN PELVIS W CONTRAST  COMPARISON: None  INDICATIONS: GENERALIZED ABDOMEN PAIN WITH NAUSEA AND VOMITTING  TECHNIQUE: After obtaining the patient's consent, CT images were created with non-ionic intravenous contrast material.   PROTOCOL:   Standard imaging protocol performed    RADIATION:   DLP: 867.6mGy*cm   Automated exposure control was utilized to minimize radiation dose. CONTRAST: 100cc Isovue 370 I.V.  FINDINGS:  Within the lung bases is dependent atelectasis.  The liver, gallbladder, adrenal glands, kidneys, spleen, and pancreas are unremarkable.  The stomach appears normal.  The small bowel appears normal in caliber and configuration.  The colon appears normal.  The appendix is enlarged with mild surrounding inflammatory changes suggesting developing appendicitis.  There is no ascites or loculated collection.  No abnormally enlarged lymph nodes are identified.  The rectum, prostate, and urinary bladder are unremarkable.  No aggressive osseous lesions are identified.      Impression:  Appendix is enlarged with mild surrounding inflammatory changes, suggesting developing appendicitis.     TYREE ARRIOLA MD       Electronically Signed and Approved By: TYREE ARRIOLA MD on 5/08/2023 at 8:18                    Assessment & Plan         Acute appendicitis    Acute appendicitis with localized peritonitis    -case request and consent for laparosocpic appendectomy possible open procedure by Dr. Ashby risks, beneftis, alternatives discussed including risks for infection, bleeding, damage to surrounding structures               Electronically signed by SHEILA Byers, 05/08/23, 9:55 AM EDT.

## 2023-05-08 NOTE — ANESTHESIA PREPROCEDURE EVALUATION
Anesthesia Evaluation     Patient summary reviewed and Nursing notes reviewed   no history of anesthetic complications:  NPO Solid Status: > 8 hours  NPO Liquid Status: > 2 hours           Airway   Mallampati: I  TM distance: >3 FB  Neck ROM: full  No difficulty expected  Dental    (+) edentulous    Pulmonary - normal exam    breath sounds clear to auscultation  (+) a smoker Current,   Cardiovascular - negative cardio ROS and normal exam  Exercise tolerance: good (4-7 METS)    Rhythm: regular  Rate: normal        Neuro/Psych- negative ROS  GI/Hepatic/Renal/Endo - negative ROS     Musculoskeletal     Abdominal    Substance History      OB/GYN          Other        ROS/Med Hx Other: PAT Nursing Notes unavailable.                   Anesthesia Plan    ASA 2 - emergent     general       Anesthetic plan, risks, benefits, and alternatives have been provided, discussed and informed consent has been obtained with: patient.        CODE STATUS:

## 2023-05-08 NOTE — ANESTHESIA POSTPROCEDURE EVALUATION
Patient: Clay Carreon    Procedure Summary     Date: 05/08/23 Room / Location: [facility] OR 01 / [facility] MAIN OR    Anesthesia Start: 1335 Anesthesia Stop: 1422    Procedure: APPENDECTOMY LAPAROSCOPIC POSSIBLE OPEN (Abdomen) Diagnosis:       Acute appendicitis, unspecified acute appendicitis type      (Acute appendicitis, unspecified acute appendicitis type [K35.80])    Surgeons: Stevenson Ashby MD Provider: Juan R Chaudhry MD    Anesthesia Type: general ASA Status: 2 - Emergent          Anesthesia Type: general    Vitals  Vitals Value Taken Time   BP 93/57 05/08/23 1446   Temp 36.2 °C (97.1 °F) 05/08/23 1421   Pulse 57 05/08/23 1447   Resp 15 05/08/23 1435   SpO2 94 % 05/08/23 1447   Vitals shown include unvalidated device data.        Post Anesthesia Care and Evaluation    Patient location during evaluation: PACU  Patient participation: complete - patient participated  Level of consciousness: awake and alert  Pain management: adequate    Airway patency: patent  Anesthetic complications: No anesthetic complications  PONV Status: none  Cardiovascular status: acceptable  Respiratory status: acceptable  Hydration status: acceptable    Comments: An Anesthesiologist personally participated in the most demanding procedures (including induction and emergence if applicable) of the anesthesia plan, and monitored the course of anesthesia administration at frequent intervals and remained physically present and available for immediate diagnosis and treatment of emergencies.

## 2023-05-08 NOTE — ED NOTES
Patient states he woke up at 0300 with abdominal pain in the RLQ, went to bathroom and had diarrhea, then started vomiting. Patient c/o persistent abdominal pain and vomiting.

## 2023-05-08 NOTE — OP NOTE
OP NOTE  APPENDECTOMY LAPAROSCOPIC POSSIBLE OPEN  Procedure Report    Patient Name:  Clay Carreon  YOB: 1970  [id number]    Date of Surgery:  5/8/2023     Indications: See consult note for indications, discussion of risk benefits and alternatives    Pre-op Diagnosis:   Acute appendicitis, unspecified acute appendicitis type [K35.80]       Post-Op Diagnosis Codes:     * Acute appendicitis, unspecified acute appendicitis type [K35.80]    Procedure/CPT® Codes:      Procedure(s):  APPENDECTOMY LAPAROSCOPIC     Staff:  Surgeon(s):  Stevenson Ashby MD    Assistant: Leora Polk CSA    Anesthesia: General, Local    Estimated Blood Loss: 5 mL    Implants:    Implant Name Type Inv. Item Serial No.  Lot No. LRB No. Used Action   RELOAD ECHELON FLEX GST REG 3.6MM ANISH - [id number] Implant RELOAD ECHELON FLEX GST REG 3.6MM ANISH  [employer]  DIV [employer] J [employer] J [id number] N/A 1 Implanted       Specimen:          Specimens     ID Source Type Tests Collected By Collected At Frozen?    A Large Intestine, Appendix Tissue · TISSUE PATHOLOGY EXAM   Stevenson Ashby MD 5/8/23 1404 No    Description: Appendix    This specimen was not marked as sent.            Findings: Inflamed appendix consistent with appendicitis.  No evidence of perforation or abscess.  Normal terminal ileum.    Complications: None    Description of Procedure:   After all questions were answered, consent was verified.  He was brought to operative room per stretcher placed in supine position arms out all extremities padded.  Bilateral lower extremity SCDs placed.  Anesthesia induced.  Preoperative IV antibiotics been given.  Patient's left upper extremity was padded and tucked.  Patient's abdominal hair removed with clippers.  Patient's abdomen prepped with ChloraPrep.  Waited 3 minutes.  Draped in usual sterile fashion.  Ioban applied.  Critical timeout taken.  Began the procedure with midline incision above the  umbilicus I entered the abdomen sharply under direct vision without injury to viscera below.  I placed a 12 balloon trocar.  I obtain pneumoperitoneum with CO2 insufflation.  I placed the patient in Trendelenburg rotated to the left.  I then placed a 5 trocar in the lower midline and left lower quadrant under direct vision.  Identified the cecum and then the appendix.  It was inflamed consistent with appendicitis.  No evidence of perforation or abscess.  Normal terminal ileum.  I then made an opening in the avascular plane of the mesoappendix at its junction with the cecum.  I then divided the appendix at its junction with the cecum with a laparoscopic [place] stapler blue load.  Division hemostatic.  The mesoappendix was divided with the LigaSure device.  Division hemostatic.  I placed the appendix in a laparoscopic retrieval device.  I infiltrated with local anesthesia bilateral upper quadrants.  I irrigated the right lower quadrant with sterile water.  I verified hemostasis.  I then removed the trocars desufflated the abdomen remove the specimen bag.  It was sent to pathology for permanent.  I closed the umbilical fascia with Vicryl.  I closed the incisions with Vicryl and staples.  Dressings applied.  At the end of the procedure all counts were correct.  I was present for the procedure.    Assistant: Leora Polk CSA was responsible for performing the following activities: Retraction, Suturing, Closing, Placing Dressing and driving camera and their skilled assistance was necessary for the success of this case.    Stevenson Ashby MD     Date: 5/8/2023  Time: 14:14 EDT

## 2023-05-10 LAB
CYTO UR: NORMAL
LAB AP CASE REPORT: NORMAL
LAB AP CLINICAL INFORMATION: NORMAL
PATH REPORT.FINAL DX SPEC: NORMAL
PATH REPORT.GROSS SPEC: NORMAL

## 2023-05-30 ENCOUNTER — OFFICE VISIT (OUTPATIENT)
Dept: SURGERY | Facility: CLINIC | Age: 53
End: 2023-05-30
Payer: COMMERCIAL

## 2023-05-30 ENCOUNTER — PREP FOR SURGERY (OUTPATIENT)
Dept: OTHER | Facility: HOSPITAL | Age: 53
End: 2023-05-30

## 2023-05-30 VITALS — BODY MASS INDEX: 24.97 KG/M2 | RESPIRATION RATE: 14 BRPM | WEIGHT: 200.8 LBS | HEIGHT: 75 IN

## 2023-05-30 DIAGNOSIS — Z12.11 ENCOUNTER FOR SCREENING FOR MALIGNANT NEOPLASM OF COLON: Primary | ICD-10-CM

## 2023-05-30 DIAGNOSIS — Z90.49 HISTORY OF LAPAROSCOPIC APPENDECTOMY: Primary | ICD-10-CM

## 2023-05-30 PROCEDURE — 99024 POSTOP FOLLOW-UP VISIT: CPT | Performed by: SURGERY

## 2023-05-30 RX ORDER — HYDROXYZINE PAMOATE 25 MG/1
1 CAPSULE ORAL 3 TIMES DAILY
COMMUNITY
Start: 2023-05-22

## 2023-05-30 RX ORDER — SODIUM CHLORIDE 0.9 % (FLUSH) 0.9 %
10 SYRINGE (ML) INJECTION AS NEEDED
OUTPATIENT
Start: 2023-05-30

## 2023-05-30 RX ORDER — TRAZODONE HYDROCHLORIDE 50 MG/1
50 TABLET ORAL
COMMUNITY
Start: 2023-05-22

## 2023-05-30 RX ORDER — SODIUM CHLORIDE 9 MG/ML
40 INJECTION, SOLUTION INTRAVENOUS AS NEEDED
OUTPATIENT
Start: 2023-05-30

## 2023-05-30 RX ORDER — PRAVASTATIN SODIUM 40 MG
40 TABLET ORAL
COMMUNITY
Start: 2023-05-22

## 2023-05-30 RX ORDER — SODIUM, POTASSIUM,MAG SULFATES 17.5-3.13G
SOLUTION, RECONSTITUTED, ORAL ORAL
Qty: 177 ML | Refills: 0 | Status: SHIPPED | OUTPATIENT
Start: 2023-05-30

## 2023-05-30 RX ORDER — SODIUM CHLORIDE 0.9 % (FLUSH) 0.9 %
3 SYRINGE (ML) INJECTION EVERY 12 HOURS SCHEDULED
OUTPATIENT
Start: 2023-05-30

## 2023-06-05 ENCOUNTER — PREP FOR SURGERY (OUTPATIENT)
Dept: SURGERY | Facility: CLINIC | Age: 53
End: 2023-06-05
Payer: COMMERCIAL

## 2023-06-05 NOTE — PROGRESS NOTES
General Surgery/Colorectal Surgery Note    Patient Name:  Clay Carreon  YOB: 1970  [id number]    Referring Provider: No ref. provider found      Patient Care Team:  Provider, No Known as PCP - General  Stevenson Ashby MD as Consulting Physician (General Surgery)    Chief complaint follow-up    Subjective .     History of present illness:    Status post laparoscopic appendectomy 5/8/2023.  Pathology with acute appendicitis.    He comes in for follow-up.  No fever, erythema, drainage.  He is pleased with the surgery.  No previous colonoscopy.  No family history of colorectal cancer.    History:  Past Medical History:   Diagnosis Date    Depression        Past Surgical History:   Procedure Laterality Date    APPENDECTOMY N/A 5/8/2023    Procedure: APPENDECTOMY LAPAROSCOPIC POSSIBLE OPEN;  Surgeon: Stevenson Ashby MD;  Location: [facility] MAIN OR;  Service: General;  Laterality: N/A;       Family History   Problem Relation Age of Onset    No Known Problems Mother     No Known Problems Father        Social History     Tobacco Use    Smoking status: Every Day     Packs/day: 1.00     Types: Cigarettes    Smokeless tobacco: Never   Vaping Use    Vaping Use: Never used   Substance Use Topics    Alcohol use: Not Currently    Drug use: Not Currently     Types: Methamphetamines     Comment: clean since march 2022       Review of Systems  All systems were reviewed and negative except for:   Review of Systems   Constitutional: Negative for chills, fever and unexpected weight loss.   HENT: Negative for congestion, nosebleeds and voice change.    Eyes: Negative for blurred vision, double vision and discharge.   Respiratory: Negative for apnea, chest tightness and shortness of breath.    Cardiovascular: Negative for chest pain and leg swelling.   Gastrointestinal:        See HPI   Endocrine: Negative for cold intolerance and heat intolerance.   Genitourinary: Negative for dysuria, hematuria and urgency.  "  Musculoskeletal: Negative for back pain, joint swelling and neck pain.   Skin: Negative for color change and dry skin.   Neurological: Negative for dizziness and confusion.   Hematological: Negative for adenopathy.   Psychiatric/Behavioral: Negative for agitation and behavioral problems.     MEDS:  Prior to Admission medications    Medication Sig Start Date End Date Taking? Authorizing Provider   FLUoxetine (PROzac) 20 MG capsule Take 1 capsule by mouth Daily.   Yes Berna Stewart MD   HYDROcodone-acetaminophen (Norco) 5-325 MG per tablet Take 1 tablet by mouth Every 6 (Six) Hours As Needed for Mild Pain. 5/8/23  Yes Stevenson Ashby MD   hydrOXYzine pamoate (VISTARIL) 25 MG capsule Take 1 capsule by mouth 3 (Three) Times a Day. 5/22/23  Yes Berna Stewart MD   polyethylene glycol (MIRALAX) 17 g packet Take 17 g by mouth Daily. 5/8/23  Yes Stevenson Ashby MD   pravastatin (PRAVACHOL) 40 MG tablet Take 1 tablet by mouth every night at bedtime. 5/22/23  Yes Berna Stewart MD   traZODone (DESYREL) 50 MG tablet Take 1 tablet by mouth every night at bedtime. 5/22/23  Yes Berna Stewart MD   sodium-potassium-magnesium sulfates (SUPREP) 17.5-3.13-1.6 GM/177ML solution oral solution Take as directed 5/30/23   Stevenson Ashby MD        Allergies:  Patient has no known allergies.    Objective     Vital Signs        Physical Exam: Incisions without evidence of infection, abdomen soft, appropriately tender, no hernia        Results Review:   {Results Review:[id number]::\"I reviewed the patient's new clinical results.\"    LABS/IMAGING:  Results for orders placed or performed during the hospital encounter of 05/08/23   Comprehensive Metabolic Panel    Specimen: Blood   Result Value Ref Range    Glucose 125 (H) 65 - 99 mg/dL    BUN 14 6 - 20 mg/dL    Creatinine 1.04 0.76 - 1.27 mg/dL    Sodium 136 136 - 145 mmol/L    Potassium 4.4 3.5 - 5.2 mmol/L    Chloride 100 98 - 107 mmol/L    CO2 " 25.9 22.0 - 29.0 mmol/L    Calcium 9.3 8.6 - 10.5 mg/dL    Total Protein 7.6 6.0 - 8.5 g/dL    Albumin 4.2 3.5 - 5.2 g/dL    ALT (SGPT) 28 1 - 41 U/L    AST (SGOT) 22 1 - 40 U/L    Alkaline Phosphatase 96 39 - 117 U/L    Total Bilirubin 0.3 0.0 - 1.2 mg/dL    Globulin 3.4 gm/dL    A/G Ratio 1.2 g/dL    BUN/Creatinine Ratio 13.5 7.0 - 25.0    Anion Gap 10.1 5.0 - 15.0 mmol/L    eGFR 86.4 >60.0 mL/min/1.73   Lipase    Specimen: Blood   Result Value Ref Range    Lipase 43 13 - 60 U/L   Urinalysis With Microscopic If Indicated (No Culture) - Urine, Clean Catch    Specimen: Urine, Clean Catch   Result Value Ref Range    Color, UA Yellow Yellow, Straw    Appearance, UA Clear Clear    pH, UA <=5.0 5.0 - 8.0    Specific Gravity, UA 1.026 1.005 - 1.030    Glucose, UA Negative Negative    Ketones, UA Negative Negative    Bilirubin, UA Negative Negative    Blood, UA Trace (A) Negative    Protein, UA Negative Negative    Leuk Esterase, UA Negative Negative    Nitrite, UA Negative Negative    Urobilinogen, UA 1.0 E.U./dL 0.2 - 1.0 E.U./dL   Lactic Acid, Plasma    Specimen: Blood   Result Value Ref Range    Lactate 2.3 (C) 0.5 - 2.0 mmol/L   CBC Auto Differential    Specimen: Blood   Result Value Ref Range    WBC 16.23 (H) 3.40 - 10.80 10*3/mm3    RBC 5.33 4.14 - 5.80 10*6/mm3    Hemoglobin 15.7 13.0 - 17.7 g/dL    Hematocrit 47.1 37.5 - 51.0 %    MCV 88.4 79.0 - 97.0 fL    MCH 29.5 26.6 - 33.0 pg    MCHC 33.3 31.5 - 35.7 g/dL    RDW 14.3 12.3 - 15.4 %    RDW-SD 46.3 37.0 - 54.0 fl    MPV 9.7 6.0 - 12.0 fL    Platelets 262 140 - 450 10*3/mm3    Neutrophil % 76.6 (H) 42.7 - 76.0 %    Lymphocyte % 14.5 (L) 19.6 - 45.3 %    Monocyte % 6.2 5.0 - 12.0 %    Eosinophil % 1.6 0.3 - 6.2 %    Basophil % 0.6 0.0 - 1.5 %    Immature Grans % 0.5 0.0 - 0.5 %    Neutrophils, Absolute 12.43 (H) 1.70 - 7.00 10*3/mm3    Lymphocytes, Absolute 2.36 0.70 - 3.10 10*3/mm3    Monocytes, Absolute 1.01 (H) 0.10 - 0.90 10*3/mm3    Eosinophils, Absolute  "0.26 0.00 - 0.40 10*3/mm3    Basophils, Absolute 0.09 0.00 - 0.20 10*3/mm3    Immature Grans, Absolute 0.08 (H) 0.00 - 0.05 10*3/mm3    nRBC 0.0 0.0 - 0.2 /100 WBC   STAT Lactic Acid, Reflex    Specimen: Blood   Result Value Ref Range    Lactate 1.3 0.5 - 2.0 mmol/L   Urinalysis, Microscopic Only - Urine, Clean Catch    Specimen: Urine, Clean Catch   Result Value Ref Range    RBC, UA 3-5 (A) None Seen /HPF    WBC, UA 0-2 (A) None Seen /HPF    Bacteria, UA None Seen None Seen /HPF    Squamous Epithelial Cells, UA 0-2 None Seen, 0-2 /HPF    Hyaline Casts, UA 0-2 None Seen /LPF    Methodology Automated Microscopy    Tissue Pathology Exam    Specimen: Large Intestine, Appendix; Tissue   Result Value Ref Range    Case Report       Surgical Pathology Report                         Case: [id number]                                  Authorizing Provider:  Stevenson Ashby MD  Collected:           05/08/2023 02:04 PM          Ordering Location:     [facility] MAIN Received:            05/09/2023 05:49 AM                                 OR                                                                           Pathologist:           Zully Álvarez DO                                                       Specimen:    Large Intestine, Appendix, Appendix                                                        Clinical Information       Acute appendicitis, unspecified acute appendicitis type      Final Diagnosis       Appendix, appendectomy:   - Acute appendicitis and periappendicitis        Gross Description       1. Large Intestine, Appendix.  The specimen is received in 1 formalin filled container labeled \"appendix\" and consists of a 5.5 cm in length by 1.0 cm in average diameter intact, vermiform appendix received with a stapled proximal margin and up to 3.0 cm of attached mesoappendix.  The tan, hemorrhagic serosa is covered with gray, adherent exudate.  Sectioning reveals purulent fecal material, a " luminal diameter of 0.3 cm, and an average wall thickness of 0.3 cm.  A 0.3 cm possible lymph node is identified in the attached mesoappendix.  Representative sections are submitted in 2 cassettes, to include the proximal margin, en face, in 1A. [place]        Microscopic Description       Microscopic examination performed.     Green Top (Gel)   Result Value Ref Range    Extra Tube Hold for add-ons.    Lavender Top   Result Value Ref Range    Extra Tube hold for add-on    Gold Top - SST   Result Value Ref Range    Extra Tube Hold for add-ons.    Light Blue Top   Result Value Ref Range    Extra Tube Hold for add-ons.         Result Review :     Assessment & Plan     Status post laparoscopic appendectomy 5/8/2023.  Pathology with acute appendicitis.    I reviewed the pathology with the patient.  We removed the staples.  He may slowly increase his activity as tolerated.  I recommended proceeding with screening colonoscopy in the near future.  Benefits and alternatives discussed.  Risk of procedure including risk of bleeding, perforation, missing a polyp discussed.  All questions answered.  He agrees with the plan.  Orders placed.  Thank you for the consult.           This document has been electronically signed by Stevenson Ashby MD  June 5, 2023 07:20 EDT

## 2023-09-28 ENCOUNTER — TELEPHONE (OUTPATIENT)
Dept: SURGERY | Facility: CLINIC | Age: 53
End: 2023-09-28
Payer: COMMERCIAL

## 2023-09-28 NOTE — TELEPHONE ENCOUNTER
Attempted to reach the patient, phone not accepting calls at this time. Called the alternate number as well and left a message to have him call the office.

## 2023-10-02 NOTE — TELEPHONE ENCOUNTER
The phone number is not working.   I tried calling pt. No answer.  I will send referring provider a letter.

## 2025-08-11 ENCOUNTER — HOSPITAL ENCOUNTER (EMERGENCY)
Facility: HOSPITAL | Age: 55
Discharge: HOME | End: 2025-08-12
Attending: EMERGENCY MEDICINE | Admitting: EMERGENCY MEDICINE
Payer: MEDICAID

## 2025-08-11 ENCOUNTER — APPOINTMENT (OUTPATIENT)
Dept: CT IMAGING | Facility: HOSPITAL | Age: 55
End: 2025-08-11
Payer: MEDICAID

## 2025-08-11 ENCOUNTER — APPOINTMENT (OUTPATIENT)
Dept: GENERAL RADIOLOGY | Facility: HOSPITAL | Age: 55
End: 2025-08-11
Payer: MEDICAID

## 2025-08-11 DIAGNOSIS — K51.00 PANCOLITIS: Primary | ICD-10-CM

## 2025-08-11 DIAGNOSIS — R93.2 ABNORMAL CT OF LIVER: ICD-10-CM

## 2025-08-11 LAB
ALBUMIN SERPL-MCNC: 3.6 G/DL (ref 3.5–5.2)
ALBUMIN/GLOB SERPL: 1.2 G/DL
ALP SERPL-CCNC: 84 U/L (ref 39–117)
ALT SERPL W P-5'-P-CCNC: 35 U/L (ref 1–41)
ANION GAP SERPL CALCULATED.3IONS-SCNC: 10.4 MMOL/L (ref 5–15)
AST SERPL-CCNC: 29 U/L (ref 1–40)
BACTERIA UR QL AUTO: NORMAL /HPF
BILIRUB SERPL-MCNC: 0.2 MG/DL (ref 0–1.2)
BILIRUB UR QL STRIP: NEGATIVE
BUN SERPL-MCNC: 11.9 MG/DL (ref 6–20)
BUN/CREAT SERPL: 11.3 (ref 7–25)
BURR CELLS BLD QL SMEAR: ABNORMAL
CALCIUM SPEC-SCNC: 8.4 MG/DL (ref 8.6–10.5)
CHLORIDE SERPL-SCNC: 99 MMOL/L (ref 98–107)
CLARITY UR: CLEAR
CO2 SERPL-SCNC: 24.6 MMOL/L (ref 22–29)
COLOR UR: YELLOW
CREAT SERPL-MCNC: 1.05 MG/DL (ref 0.76–1.27)
D-LACTATE SERPL-SCNC: 1.5 MMOL/L (ref 0.5–2)
DEPRECATED RDW RBC AUTO: 40.7 FL (ref 37–54)
EGFRCR SERPLBLD CKD-EPI 2021: 84.4 ML/MIN/1.73
EOSINOPHIL # BLD MANUAL: 1.93 10*3/MM3 (ref 0–0.4)
EOSINOPHIL NFR BLD MANUAL: 23 % (ref 0.3–6.2)
ERYTHROCYTE [DISTWIDTH] IN BLOOD BY AUTOMATED COUNT: 13 % (ref 12.3–15.4)
GEN 5 1HR TROPONIN T REFLEX: 7 NG/L
GLOBULIN UR ELPH-MCNC: 3 GM/DL
GLUCOSE SERPL-MCNC: 107 MG/DL (ref 65–99)
GLUCOSE UR STRIP-MCNC: NEGATIVE MG/DL
HCT VFR BLD AUTO: 41.9 % (ref 37.5–51)
HGB BLD-MCNC: 14.1 G/DL (ref 13–17.7)
HGB UR QL STRIP.AUTO: ABNORMAL
HOLD SPECIMEN: NORMAL
HOLD SPECIMEN: NORMAL
HYALINE CASTS UR QL AUTO: NORMAL /LPF
KETONES UR QL STRIP: NEGATIVE
LEUKOCYTE ESTERASE UR QL STRIP.AUTO: NEGATIVE
LIPASE SERPL-CCNC: 28 U/L (ref 13–60)
LYMPHOCYTES # BLD MANUAL: 2.51 10*3/MM3 (ref 0.7–3.1)
LYMPHOCYTES NFR BLD MANUAL: 17 % (ref 5–12)
MAGNESIUM SERPL-MCNC: 2 MG/DL (ref 1.6–2.6)
MCH RBC QN AUTO: 28.9 PG (ref 26.6–33)
MCHC RBC AUTO-ENTMCNC: 33.7 G/DL (ref 31.5–35.7)
MCV RBC AUTO: 85.9 FL (ref 79–97)
MONOCYTES # BLD: 1.42 10*3/MM3 (ref 0.1–0.9)
NEUTROPHILS # BLD AUTO: 2.51 10*3/MM3 (ref 1.7–7)
NEUTROPHILS NFR BLD MANUAL: 23 % (ref 42.7–76)
NEUTS BAND NFR BLD MANUAL: 7 % (ref 0–5)
NITRITE UR QL STRIP: NEGATIVE
PATHOLOGY REVIEW: YES
PH UR STRIP.AUTO: 6 [PH] (ref 5–8)
PLATELET # BLD AUTO: 259 10*3/MM3 (ref 140–450)
PMV BLD AUTO: 9 FL (ref 6–12)
POTASSIUM SERPL-SCNC: 3.8 MMOL/L (ref 3.5–5.2)
PROT SERPL-MCNC: 6.6 G/DL (ref 6–8.5)
PROT UR QL STRIP: NEGATIVE
RBC # BLD AUTO: 4.88 10*6/MM3 (ref 4.14–5.8)
RBC # UR STRIP: NORMAL /HPF
REF LAB TEST METHOD: NORMAL
SCAN SLIDE: NORMAL
SMALL PLATELETS BLD QL SMEAR: ADEQUATE
SODIUM SERPL-SCNC: 134 MMOL/L (ref 136–145)
SP GR UR STRIP: 1.01 (ref 1–1.03)
SQUAMOUS #/AREA URNS HPF: NORMAL /HPF
TROPONIN T NUMERIC DELTA: 0 NG/L
TROPONIN T SERPL HS-MCNC: 7 NG/L
UROBILINOGEN UR QL STRIP: ABNORMAL
VARIANT LYMPHS NFR BLD MANUAL: 1 % (ref 0–5)
VARIANT LYMPHS NFR BLD MANUAL: 29 % (ref 19.6–45.3)
WBC # UR STRIP: NORMAL /HPF
WBC MORPH BLD: NORMAL
WBC NRBC COR # BLD AUTO: 8.37 10*3/MM3 (ref 3.4–10.8)
WHOLE BLOOD HOLD COAG: NORMAL
WHOLE BLOOD HOLD SPECIMEN: NORMAL

## 2025-08-11 PROCEDURE — 85025 COMPLETE CBC W/AUTO DIFF WBC: CPT

## 2025-08-11 PROCEDURE — 96374 THER/PROPH/DIAG INJ IV PUSH: CPT

## 2025-08-11 PROCEDURE — 84484 ASSAY OF TROPONIN QUANT: CPT | Performed by: EMERGENCY MEDICINE

## 2025-08-11 PROCEDURE — 83690 ASSAY OF LIPASE: CPT | Performed by: EMERGENCY MEDICINE

## 2025-08-11 PROCEDURE — 93005 ELECTROCARDIOGRAM TRACING: CPT

## 2025-08-11 PROCEDURE — 25010000002 KETOROLAC TROMETHAMINE PER 15 MG: Performed by: REGISTERED NURSE

## 2025-08-11 PROCEDURE — 81001 URINALYSIS AUTO W/SCOPE: CPT | Performed by: EMERGENCY MEDICINE

## 2025-08-11 PROCEDURE — 93005 ELECTROCARDIOGRAM TRACING: CPT | Performed by: EMERGENCY MEDICINE

## 2025-08-11 PROCEDURE — 71045 X-RAY EXAM CHEST 1 VIEW: CPT

## 2025-08-11 PROCEDURE — 74177 CT ABD & PELVIS W/CONTRAST: CPT

## 2025-08-11 PROCEDURE — 36415 COLL VENOUS BLD VENIPUNCTURE: CPT | Performed by: EMERGENCY MEDICINE

## 2025-08-11 PROCEDURE — 83735 ASSAY OF MAGNESIUM: CPT | Performed by: EMERGENCY MEDICINE

## 2025-08-11 PROCEDURE — 83605 ASSAY OF LACTIC ACID: CPT | Performed by: EMERGENCY MEDICINE

## 2025-08-11 PROCEDURE — 80053 COMPREHEN METABOLIC PANEL: CPT | Performed by: EMERGENCY MEDICINE

## 2025-08-11 PROCEDURE — 36415 COLL VENOUS BLD VENIPUNCTURE: CPT

## 2025-08-11 PROCEDURE — 99285 EMERGENCY DEPT VISIT HI MDM: CPT

## 2025-08-11 PROCEDURE — 25510000001 IOPAMIDOL PER 1 ML: Performed by: EMERGENCY MEDICINE

## 2025-08-11 PROCEDURE — 85007 BL SMEAR W/DIFF WBC COUNT: CPT

## 2025-08-11 RX ORDER — KETOROLAC TROMETHAMINE 15 MG/ML
15 INJECTION, SOLUTION INTRAMUSCULAR; INTRAVENOUS ONCE
Status: COMPLETED | OUTPATIENT
Start: 2025-08-11 | End: 2025-08-11

## 2025-08-11 RX ORDER — SODIUM CHLORIDE 0.9 % (FLUSH) 0.9 %
10 SYRINGE (ML) INJECTION AS NEEDED
Status: DISCONTINUED | OUTPATIENT
Start: 2025-08-11 | End: 2025-08-12 | Stop reason: HOSPADM

## 2025-08-11 RX ORDER — IOPAMIDOL 755 MG/ML
100 INJECTION, SOLUTION INTRAVASCULAR
Status: COMPLETED | OUTPATIENT
Start: 2025-08-11 | End: 2025-08-11

## 2025-08-11 RX ADMIN — KETOROLAC TROMETHAMINE 15 MG: 15 INJECTION, SOLUTION INTRAMUSCULAR; INTRAVENOUS at 22:15

## 2025-08-11 RX ADMIN — IOPAMIDOL 85 ML: 755 INJECTION, SOLUTION INTRAVENOUS at 22:52

## 2025-08-12 VITALS
DIASTOLIC BLOOD PRESSURE: 70 MMHG | SYSTOLIC BLOOD PRESSURE: 101 MMHG | HEART RATE: 63 BPM | HEIGHT: 75 IN | WEIGHT: 177.69 LBS | BODY MASS INDEX: 22.09 KG/M2 | TEMPERATURE: 97.8 F | OXYGEN SATURATION: 97 % | RESPIRATION RATE: 18 BRPM

## 2025-08-12 PROCEDURE — 25810000003 SODIUM CHLORIDE 0.9 % SOLUTION: Performed by: REGISTERED NURSE

## 2025-08-12 RX ORDER — ONDANSETRON 4 MG/1
4 TABLET, ORALLY DISINTEGRATING ORAL EVERY 8 HOURS PRN
Qty: 30 TABLET | Refills: 0 | Status: SHIPPED | OUTPATIENT
Start: 2025-08-12 | End: 2025-08-22

## 2025-08-12 RX ORDER — KETOROLAC TROMETHAMINE 10 MG/1
10 TABLET, FILM COATED ORAL EVERY 6 HOURS PRN
Qty: 20 TABLET | Refills: 0 | Status: SHIPPED | OUTPATIENT
Start: 2025-08-12 | End: 2025-08-17

## 2025-08-12 RX ADMIN — AMOXICILLIN AND CLAVULANATE POTASSIUM 1 TABLET: 875; 125 TABLET, COATED ORAL at 00:18

## 2025-08-12 RX ADMIN — SODIUM CHLORIDE 1000 ML: 9 INJECTION, SOLUTION INTRAVENOUS at 00:04

## 2025-08-16 LAB
QT INTERVAL: 370 MS
QTC INTERVAL: 411 MS

## (undated) DEVICE — LAPAROVUE VISIBILITY SYSTEM LAPAROSCOPIC SOLUTIONS: Brand: LAPAROVUE

## (undated) DEVICE — ADHS SKIN PREMIERPRO EXOFIN TOPICAL HI/VISC .5ML

## (undated) DEVICE — TISSUE RETRIEVAL SYSTEM: Brand: INZII RETRIEVAL SYSTEM

## (undated) DEVICE — TROCAR: Brand: KII® SLEEVE

## (undated) DEVICE — ENDOPATH XCEL WITH OPTIVIEW TECHNOLOGY BLADELESS TROCARS WITH STABILITY SLEEVES: Brand: ENDOPATH XCEL OPTIVIEW

## (undated) DEVICE — GLV SURG BIOGEL LTX PF 7 1/2

## (undated) DEVICE — SUT VIC 0 UR6 27IN VCP603H

## (undated) DEVICE — 3M™ IOBAN™ 2 ANTIMICROBIAL INCISE DRAPE 6650EZ: Brand: IOBAN™ 2

## (undated) DEVICE — STERILE POLYISOPRENE POWDER-FREE SURGICAL GLOVES WITH EMOLLIENT COATING: Brand: PROTEXIS

## (undated) DEVICE — LAPAROSCOPIC TROCAR SLEEVE/SINGLE USE: Brand: KII® OPTICAL ACCESS SYSTEM

## (undated) DEVICE — TROCARS: Brand: KII® BALLOON BLUNT TIP SYSTEM

## (undated) DEVICE — SLV SCD KN/LEN ADJ EXPRSS BLENDED MD 1P/U

## (undated) DEVICE — SYR LUERLOK 30CC

## (undated) DEVICE — GENERAL LAPAROSCOPY-LF: Brand: MEDLINE INDUSTRIES, INC.

## (undated) DEVICE — SUT MNCRYL 4/0 PS2 18 IN

## (undated) DEVICE — GLV SURG SENSICARE SLT PF LF 7.5 STRL

## (undated) DEVICE — SOL IRR H2O BTL 1000ML STRL

## (undated) DEVICE — STERILE POLYISOPRENE POWDER-FREE SURGICAL GLOVES: Brand: PROTEXIS

## (undated) DEVICE — PENCL E/S SMOKEEVAC W/TELESCP CANN

## (undated) DEVICE — 2, DISPOSABLE SUCTION/IRRIGATOR WITHOUT DISPOSABLE TIP: Brand: STRYKEFLOW

## (undated) DEVICE — RETRACTABLE L-HOOK LAPAROSCOPIC SEALER/DIVIDER: Brand: LIGASURE

## (undated) DEVICE — ELECTRD BLD EDGE COAT 3IN

## (undated) DEVICE — ECHELON FLEX POWERED PLUS ARTICULATING ENDOSCOPIC LINEAR CUTTER , 60MM: Brand: ECHELON FLEX